# Patient Record
Sex: FEMALE | Race: WHITE | NOT HISPANIC OR LATINO | Employment: FULL TIME | URBAN - METROPOLITAN AREA
[De-identification: names, ages, dates, MRNs, and addresses within clinical notes are randomized per-mention and may not be internally consistent; named-entity substitution may affect disease eponyms.]

---

## 2023-02-23 LAB
ALANINE AMINOTRANSFERASE (SGPT) (U/L) IN SER/PLAS: 23 U/L (ref 7–45)
ALBUMIN (G/DL) IN SER/PLAS: 4.5 G/DL (ref 3.4–5)
ALKALINE PHOSPHATASE (U/L) IN SER/PLAS: 57 U/L (ref 33–110)
ANION GAP IN SER/PLAS: 13 MMOL/L (ref 10–20)
ASPARTATE AMINOTRANSFERASE (SGOT) (U/L) IN SER/PLAS: 21 U/L (ref 9–39)
BILIRUBIN TOTAL (MG/DL) IN SER/PLAS: 1.6 MG/DL (ref 0–1.2)
CALCIUM (MG/DL) IN SER/PLAS: 10.3 MG/DL (ref 8.6–10.6)
CARBON DIOXIDE, TOTAL (MMOL/L) IN SER/PLAS: 28 MMOL/L (ref 21–32)
CHLORIDE (MMOL/L) IN SER/PLAS: 101 MMOL/L (ref 98–107)
CHOLESTEROL (MG/DL) IN SER/PLAS: 196 MG/DL (ref 0–199)
CHOLESTEROL IN HDL (MG/DL) IN SER/PLAS: 73.2 MG/DL
CHOLESTEROL/HDL RATIO: 2.7
CREATININE (MG/DL) IN SER/PLAS: 0.76 MG/DL (ref 0.5–1.05)
GFR FEMALE: >90 ML/MIN/1.73M2
GLUCOSE (MG/DL) IN SER/PLAS: 94 MG/DL (ref 74–99)
LDL: 102 MG/DL (ref 0–99)
POTASSIUM (MMOL/L) IN SER/PLAS: 4.5 MMOL/L (ref 3.5–5.3)
PROTEIN TOTAL: 7.7 G/DL (ref 6.4–8.2)
SODIUM (MMOL/L) IN SER/PLAS: 137 MMOL/L (ref 136–145)
TRIGLYCERIDE (MG/DL) IN SER/PLAS: 104 MG/DL (ref 0–149)
UREA NITROGEN (MG/DL) IN SER/PLAS: 12 MG/DL (ref 6–23)
VLDL: 21 MG/DL (ref 0–40)

## 2023-04-25 ENCOUNTER — OFFICE VISIT (OUTPATIENT)
Dept: PRIMARY CARE | Facility: CLINIC | Age: 48
End: 2023-04-25
Payer: COMMERCIAL

## 2023-04-25 VITALS
HEART RATE: 80 BPM | HEIGHT: 62 IN | SYSTOLIC BLOOD PRESSURE: 124 MMHG | DIASTOLIC BLOOD PRESSURE: 72 MMHG | WEIGHT: 158 LBS | BODY MASS INDEX: 29.08 KG/M2 | TEMPERATURE: 98.6 F | RESPIRATION RATE: 18 BRPM

## 2023-04-25 DIAGNOSIS — K29.00 ACUTE SUPERFICIAL GASTRITIS WITHOUT HEMORRHAGE: ICD-10-CM

## 2023-04-25 DIAGNOSIS — R10.11 RIGHT UPPER QUADRANT ABDOMINAL PAIN: ICD-10-CM

## 2023-04-25 DIAGNOSIS — R07.89 ATYPICAL CHEST PAIN: Primary | ICD-10-CM

## 2023-04-25 PROBLEM — M25.50 ARTHRALGIA: Status: ACTIVE | Noted: 2023-04-25

## 2023-04-25 PROBLEM — E78.5 HYPERLIPIDEMIA: Status: ACTIVE | Noted: 2023-04-25

## 2023-04-25 PROCEDURE — 1036F TOBACCO NON-USER: CPT | Performed by: INTERNAL MEDICINE

## 2023-04-25 PROCEDURE — 99214 OFFICE O/P EST MOD 30 MIN: CPT | Performed by: INTERNAL MEDICINE

## 2023-04-25 RX ORDER — BISACODYL 5 MG/1
1 TABLET, COATED ORAL DAILY
COMMUNITY

## 2023-04-25 RX ORDER — OMEPRAZOLE 40 MG/1
40 CAPSULE, DELAYED RELEASE ORAL
Qty: 30 CAPSULE | Refills: 1 | Status: SHIPPED | OUTPATIENT
Start: 2023-04-25 | End: 2023-06-14 | Stop reason: SDUPTHER

## 2023-04-25 RX ORDER — ROSUVASTATIN CALCIUM 10 MG/1
10 TABLET, COATED ORAL NIGHTLY
COMMUNITY
End: 2023-08-14 | Stop reason: SDUPTHER

## 2023-04-25 ASSESSMENT — PATIENT HEALTH QUESTIONNAIRE - PHQ9
2. FEELING DOWN, DEPRESSED OR HOPELESS: NOT AT ALL
SUM OF ALL RESPONSES TO PHQ9 QUESTIONS 1 AND 2: 0
1. LITTLE INTEREST OR PLEASURE IN DOING THINGS: NOT AT ALL

## 2023-04-25 ASSESSMENT — ENCOUNTER SYMPTOMS: DEPRESSION: 0

## 2023-04-25 ASSESSMENT — COLUMBIA-SUICIDE SEVERITY RATING SCALE - C-SSRS
1. IN THE PAST MONTH, HAVE YOU WISHED YOU WERE DEAD OR WISHED YOU COULD GO TO SLEEP AND NOT WAKE UP?: NO
6. HAVE YOU EVER DONE ANYTHING, STARTED TO DO ANYTHING, OR PREPARED TO DO ANYTHING TO END YOUR LIFE?: NO
2. HAVE YOU ACTUALLY HAD ANY THOUGHTS OF KILLING YOURSELF?: NO

## 2023-04-25 NOTE — PROGRESS NOTES
Subjective   Patient ID: Mariam Hartley is a 47 y.o. female who presents for pain under left pain, heartburn for 8 weeks .  HPI  Patient is here today for pain that she has been feeling under her left breast that radiates up to her neck and her left shoulder.  Its been there for about 6 to 8 weeks.  She states that its not gotten better or worse it is not related to activity or heavy exertion.  It is not related to motion.  She denies being related to food.  She is kind of track that and has not noticed a difference.  She added famotidine and she has noted some small improvement after being on it for just about 1 week.  She saw cardiologist last 5 years ago has a history of high cholesterol and has a strong family history of coronary artery disease so she was concerned.  Her intra-abdominal organs are intact and she still has a gallbladder.  She does not taste a bad taste in her mouth the pain does radiate up into her chest and neck and her bowel movements are normal.  She had last had blood work in February and that was normal it was a CMP and a lipid panel    Review of Systems  Review of systems was performed and is otherwise negative except as noted in HPI.  Objective   Vitals:    04/25/23 1417   BP: 124/72   Pulse: 80   Resp: 18   Temp: 37 °C (98.6 °F)      Physical Exam  HEENT is within normal limits  Lungs are clear bilaterally  Heart is regular rate and rhythm no murmurs  Abdomen is benign soft nontender to palpation  Assessment/Plan   Diagnoses and all orders for this visit:  Atypical chest pain  -     ECG 12 lead; Future  -     Referral to Cardiology; Future  Right upper quadrant abdominal pain  -     US gallbladder; Future  Acute superficial gastritis without hemorrhage  -     omeprazole (PriLOSEC) 40 mg DR capsule; Take 1 capsule (40 mg) by mouth once daily in the morning. Take before meals. Do not crush or chew.    Ordering ultrasound of the right upper quadrant  We will try omeprazole.  Imodium for  possible acid reflux  Ordered a twelve-lead EKG and also referred to cardiology patient wondering about traveling next week as long as the EKG is okay to try to get the ultrasound done ASAP and start the omeprazole if she is feeling better she can go but if not she should stay home and she was referred to cardiology for further evaluation  She needs to see me in 3 to 4 weeks  Sammi Winter MD

## 2023-05-13 PROBLEM — N97.9 FEMALE INFERTILITY: Status: ACTIVE | Noted: 2023-05-13

## 2023-05-13 PROBLEM — N80.9 ENDOMETRIOSIS DETERMINED BY LAPAROSCOPY: Status: ACTIVE | Noted: 2023-05-13

## 2023-05-16 ENCOUNTER — APPOINTMENT (OUTPATIENT)
Dept: PRIMARY CARE | Facility: CLINIC | Age: 48
End: 2023-05-16
Payer: COMMERCIAL

## 2023-05-22 ENCOUNTER — APPOINTMENT (OUTPATIENT)
Dept: PRIMARY CARE | Facility: CLINIC | Age: 48
End: 2023-05-22
Payer: COMMERCIAL

## 2023-06-14 ENCOUNTER — OFFICE VISIT (OUTPATIENT)
Dept: PRIMARY CARE | Facility: CLINIC | Age: 48
End: 2023-06-14
Payer: COMMERCIAL

## 2023-06-14 VITALS
RESPIRATION RATE: 16 BRPM | WEIGHT: 160 LBS | BODY MASS INDEX: 29.44 KG/M2 | HEIGHT: 62 IN | HEART RATE: 76 BPM | TEMPERATURE: 97.3 F | SYSTOLIC BLOOD PRESSURE: 100 MMHG | DIASTOLIC BLOOD PRESSURE: 60 MMHG

## 2023-06-14 DIAGNOSIS — K29.00 ACUTE SUPERFICIAL GASTRITIS WITHOUT HEMORRHAGE: ICD-10-CM

## 2023-06-14 PROCEDURE — 1036F TOBACCO NON-USER: CPT | Performed by: INTERNAL MEDICINE

## 2023-06-14 PROCEDURE — 99213 OFFICE O/P EST LOW 20 MIN: CPT | Performed by: INTERNAL MEDICINE

## 2023-06-14 RX ORDER — OMEPRAZOLE 40 MG/1
40 CAPSULE, DELAYED RELEASE ORAL
Qty: 30 CAPSULE | Refills: 1 | Status: SHIPPED | OUTPATIENT
Start: 2023-06-14 | End: 2024-02-23 | Stop reason: ALTCHOICE

## 2023-06-14 NOTE — PROGRESS NOTES
"Subjective   Patient ID: Mariam Hartley is a 47 y.o. female who presents for fu testing.  HPI  Patient is here for follow-up today.  She was seen in April for chest pain and discomfort.  She was referred to cardiology.  She actually did not call because when she started the omeprazole her symptoms almost completely resolved.  She says she has had 1 or 2 episodes of some burning sensation since she started the omeprazole but she feels that is an acid issue.  She is about 6 weeks into her 12-week course.  She is feeling well her appetite is good she has no more chest pains or shortness of breath no headaches no dizziness no lightheadedness no lower extremity edema and her exercise tolerance is good.  She denies any Headaches she has no constipation she is tolerating the meds well.      Review of Systems  Review of systems was performed and is otherwise negative except as noted in HPI.  Objective   /60   Pulse 76   Temp 36.3 °C (97.3 °F)   Resp 16   Ht 1.575 m (5' 2\")   Wt 72.6 kg (160 lb)   BMI 29.26 kg/m²    Physical Exam  HEENT is normal  Lungs clear bilaterally  Heart regular rate and rhythm no murmurs  Abdomen is benign    Assessment/Plan   Diagnoses and all orders for this visit:  Acute superficial gastritis without hemorrhage  -     omeprazole (PriLOSEC) 40 mg DR capsule; Take 1 capsule (40 mg) by mouth once daily in the morning. Take before meals. Do not crush or chew.    Patient will complete 4 weeks of medication and then go off of it I sent her refill.  If she is unable to successfully discontinue the medication she will call me.  GI referral she will call me in the meantime if she has any other issues  Sammi Winter MD   "

## 2023-07-24 ENCOUNTER — APPOINTMENT (OUTPATIENT)
Dept: PRIMARY CARE | Facility: CLINIC | Age: 48
End: 2023-07-24
Payer: COMMERCIAL

## 2023-08-14 ENCOUNTER — OFFICE VISIT (OUTPATIENT)
Dept: PRIMARY CARE | Facility: CLINIC | Age: 48
End: 2023-08-14
Payer: COMMERCIAL

## 2023-08-14 VITALS
TEMPERATURE: 97.9 F | HEART RATE: 68 BPM | DIASTOLIC BLOOD PRESSURE: 70 MMHG | SYSTOLIC BLOOD PRESSURE: 122 MMHG | WEIGHT: 160 LBS | BODY MASS INDEX: 29.44 KG/M2 | HEIGHT: 62 IN | OXYGEN SATURATION: 99 %

## 2023-08-14 DIAGNOSIS — E78.2 MIXED HYPERLIPIDEMIA: ICD-10-CM

## 2023-08-14 DIAGNOSIS — Z00.00 ROUTINE GENERAL MEDICAL EXAMINATION AT A HEALTH CARE FACILITY: Primary | ICD-10-CM

## 2023-08-14 PROCEDURE — 99396 PREV VISIT EST AGE 40-64: CPT | Performed by: INTERNAL MEDICINE

## 2023-08-14 PROCEDURE — 1036F TOBACCO NON-USER: CPT | Performed by: INTERNAL MEDICINE

## 2023-08-14 RX ORDER — ROSUVASTATIN CALCIUM 10 MG/1
10 TABLET, COATED ORAL NIGHTLY
Qty: 30 TABLET | Refills: 3 | Status: SHIPPED | OUTPATIENT
Start: 2023-08-14 | End: 2023-10-27 | Stop reason: SDUPTHER

## 2023-08-14 ASSESSMENT — PAIN SCALES - GENERAL: PAINLEVEL: 0-NO PAIN

## 2023-08-14 NOTE — PROGRESS NOTES
"Subjective   Patient ID: Mariam Hartley is a 47 y.o. female who presents for Annual Exam.  HPI  Mariam Hartley is here for annual check-up.      Concerns none    Reported Health good    Dental visits reg  Vision checks reg    Diet healthy  Exercise no  Caffeine tea  Tobacco never  Alcohol  yes    Female : Menstrual status /pregnancy status  reg sees gyn    Colorectal cancer screening  utd    Current issues no issues on omerpazole she will complete the next prescription and then go off of it she is aware if her symptoms flare again that she will need to see gastroenterology.  She does have a doctor because she has had a colonoscopy    Review of Systems  GENERAL - Denies fever, fatigue or chills  SKIN - Denies rash, new skin lesions, or change in moles  EYES - Denies blurred vision, or change in visual acuity  EARS - Denies ear pain, discharge, ringing, or difficulty hearing  NOSE - Denies nasal congestion, discharge, or bleeding  MOUTH - Denies sore throat, postnasal drip or painful/difficulty swallowing  NECK - Denies pain or swelling  RESPIRATORY - Denies shortness of breath, cough, wheezing  CARDIOVASCULAR - Denies palpitations, chest pain, orthopnea, peripheral edema, syncope or claudication  GASTROINTESTINAL - Denies nausea, vomiting, diarrhea, constipation, abdominal pain, melena and or bright red blood  GENITOURINARY - Denies dysuria, frequency of urination, urgency, or hesitancy  MUSCULOSKELETAL - Denies joint or muscle pain, or back pain  NEUROLOGICAL - Denies localized numbness, weakness, or tingling  PSYCHIATRIC - Denies depression, anxiety, substance abuse, suicidal or homicidal ideation  ENDOCRINE - Denies heat or cold intolerance, weight loss or gain, increasing thirst  HEMATO-IMMUNOLOGIC - Denies easy bruising, bleeding, oral ulcerations or recurrent infections      Objective   /70   Pulse 68   Temp 36.6 °C (97.9 °F) (Oral)   Ht 1.575 m (5' 2\")   Wt 72.6 kg (160 lb)   SpO2 99%   BMI " 29.26 kg/m²    Physical Exam  CONSTITUTIONAL - well nourished, well developed, looks like stated age, in no acute distress, not ill-appearing, and not tired appearing  SKIN - normal skin color and pigmentation, normal skin turgor without rash, lesions, or nodules visualized  HEAD - no trauma, normocephalic  EYES - nl  ENT - TM's intact, no injection, no exudate,  nasal passage without discharge and patent  NECK - supple without rigidity, no neck mass was observed, no thyromegaly or thyroid nodules  CHEST - clear to auscultation, no wheezing, no crackles and no rales, good effort  CARDIAC - regular rate and regular rhythm, no skipped beats, no murmur  ABDOMEN - no organomegaly, soft, nontender, nondistended, normal bowel sounds, no guarding/rebound/rigidity  EXTREMITIES - no edema, no deformities  NEUROLOGICAL -  nl  PSYCHIATRIC - alert, pleasant and cordial, age-appropriate  LYMPHATIC- no cervical lymphadenopathy    Assessment/Plan   Diagnoses and all orders for this visit:  Routine general medical examination at a health care facility  -     CBC and Auto Differential; Future  -     Comprehensive Metabolic Panel; Future  -     Lipid Panel; Future  -     TSH with reflex to Free T4 if abnormal; Future  Mixed hyperlipidemia    Call with issues  Follow-up with me  annually  If acid reflux symptoms flare she has been to call her gastroenterologist  She will need a 6-month follow-up appointment for her cholesterol medicine      Sammi Winter MD

## 2023-09-01 ENCOUNTER — LAB (OUTPATIENT)
Dept: LAB | Facility: LAB | Age: 48
End: 2023-09-01
Payer: COMMERCIAL

## 2023-09-01 DIAGNOSIS — Z00.00 ROUTINE GENERAL MEDICAL EXAMINATION AT A HEALTH CARE FACILITY: ICD-10-CM

## 2023-09-01 LAB
ALANINE AMINOTRANSFERASE (SGPT) (U/L) IN SER/PLAS: 23 U/L (ref 7–45)
ALBUMIN (G/DL) IN SER/PLAS: 4.6 G/DL (ref 3.4–5)
ALKALINE PHOSPHATASE (U/L) IN SER/PLAS: 58 U/L (ref 33–110)
ANION GAP IN SER/PLAS: 15 MMOL/L (ref 10–20)
ASPARTATE AMINOTRANSFERASE (SGOT) (U/L) IN SER/PLAS: 23 U/L (ref 9–39)
BASOPHILS (10*3/UL) IN BLOOD BY AUTOMATED COUNT: 0.02 X10E9/L (ref 0–0.1)
BASOPHILS/100 LEUKOCYTES IN BLOOD BY AUTOMATED COUNT: 0.3 % (ref 0–2)
BILIRUBIN TOTAL (MG/DL) IN SER/PLAS: 1.4 MG/DL (ref 0–1.2)
CALCIUM (MG/DL) IN SER/PLAS: 10.2 MG/DL (ref 8.6–10.6)
CARBON DIOXIDE, TOTAL (MMOL/L) IN SER/PLAS: 26 MMOL/L (ref 21–32)
CHLORIDE (MMOL/L) IN SER/PLAS: 100 MMOL/L (ref 98–107)
CHOLESTEROL (MG/DL) IN SER/PLAS: 195 MG/DL (ref 0–199)
CHOLESTEROL IN HDL (MG/DL) IN SER/PLAS: 77.3 MG/DL
CHOLESTEROL/HDL RATIO: 2.5
CREATININE (MG/DL) IN SER/PLAS: 0.68 MG/DL (ref 0.5–1.05)
EOSINOPHILS (10*3/UL) IN BLOOD BY AUTOMATED COUNT: 0.04 X10E9/L (ref 0–0.7)
EOSINOPHILS/100 LEUKOCYTES IN BLOOD BY AUTOMATED COUNT: 0.6 % (ref 0–6)
ERYTHROCYTE DISTRIBUTION WIDTH (RATIO) BY AUTOMATED COUNT: 12.8 % (ref 11.5–14.5)
ERYTHROCYTE MEAN CORPUSCULAR HEMOGLOBIN CONCENTRATION (G/DL) BY AUTOMATED: 33.2 G/DL (ref 32–36)
ERYTHROCYTE MEAN CORPUSCULAR VOLUME (FL) BY AUTOMATED COUNT: 100 FL (ref 80–100)
ERYTHROCYTES (10*6/UL) IN BLOOD BY AUTOMATED COUNT: 4.47 X10E12/L (ref 4–5.2)
GFR FEMALE: >90 ML/MIN/1.73M2
GLUCOSE (MG/DL) IN SER/PLAS: 85 MG/DL (ref 74–99)
HEMATOCRIT (%) IN BLOOD BY AUTOMATED COUNT: 44.6 % (ref 36–46)
HEMOGLOBIN (G/DL) IN BLOOD: 14.8 G/DL (ref 12–16)
IMMATURE GRANULOCYTES/100 LEUKOCYTES IN BLOOD BY AUTOMATED COUNT: 0.3 % (ref 0–0.9)
LDL: 105 MG/DL (ref 0–99)
LEUKOCYTES (10*3/UL) IN BLOOD BY AUTOMATED COUNT: 6.5 X10E9/L (ref 4.4–11.3)
LYMPHOCYTES (10*3/UL) IN BLOOD BY AUTOMATED COUNT: 2.03 X10E9/L (ref 1.2–4.8)
LYMPHOCYTES/100 LEUKOCYTES IN BLOOD BY AUTOMATED COUNT: 31.2 % (ref 13–44)
MONOCYTES (10*3/UL) IN BLOOD BY AUTOMATED COUNT: 0.57 X10E9/L (ref 0.1–1)
MONOCYTES/100 LEUKOCYTES IN BLOOD BY AUTOMATED COUNT: 8.8 % (ref 2–10)
NEUTROPHILS (10*3/UL) IN BLOOD BY AUTOMATED COUNT: 3.83 X10E9/L (ref 1.2–7.7)
NEUTROPHILS/100 LEUKOCYTES IN BLOOD BY AUTOMATED COUNT: 58.8 % (ref 40–80)
NRBC (PER 100 WBCS) BY AUTOMATED COUNT: 0 /100 WBC (ref 0–0)
PLATELETS (10*3/UL) IN BLOOD AUTOMATED COUNT: 256 X10E9/L (ref 150–450)
POTASSIUM (MMOL/L) IN SER/PLAS: 4.1 MMOL/L (ref 3.5–5.3)
PROTEIN TOTAL: 8 G/DL (ref 6.4–8.2)
SODIUM (MMOL/L) IN SER/PLAS: 137 MMOL/L (ref 136–145)
THYROTROPIN (MIU/L) IN SER/PLAS BY DETECTION LIMIT <= 0.05 MIU/L: 2.26 MIU/L (ref 0.44–3.98)
TRIGLYCERIDE (MG/DL) IN SER/PLAS: 66 MG/DL (ref 0–149)
UREA NITROGEN (MG/DL) IN SER/PLAS: 9 MG/DL (ref 6–23)
VLDL: 13 MG/DL (ref 0–40)

## 2023-09-01 PROCEDURE — 80053 COMPREHEN METABOLIC PANEL: CPT

## 2023-09-01 PROCEDURE — 84443 ASSAY THYROID STIM HORMONE: CPT

## 2023-09-01 PROCEDURE — 85025 COMPLETE CBC W/AUTO DIFF WBC: CPT

## 2023-09-01 PROCEDURE — 36415 COLL VENOUS BLD VENIPUNCTURE: CPT

## 2023-09-01 PROCEDURE — 80061 LIPID PANEL: CPT

## 2023-10-27 DIAGNOSIS — E78.2 MIXED HYPERLIPIDEMIA: ICD-10-CM

## 2023-10-27 RX ORDER — ROSUVASTATIN CALCIUM 10 MG/1
10 TABLET, COATED ORAL NIGHTLY
Qty: 30 TABLET | Refills: 3 | Status: SHIPPED | OUTPATIENT
Start: 2023-10-27 | End: 2024-02-23 | Stop reason: SDUPTHER

## 2023-10-27 NOTE — TELEPHONE ENCOUNTER
Spoke w/patient, needs refill on  rosuvastatin (Crestor) 10 mg   Send to [facility]  Last appt. 8/14/23  Upcoming appt. 2/23/23

## 2024-02-23 ENCOUNTER — OFFICE VISIT (OUTPATIENT)
Dept: PRIMARY CARE | Facility: CLINIC | Age: 49
End: 2024-02-23
Payer: COMMERCIAL

## 2024-02-23 VITALS
TEMPERATURE: 98.4 F | SYSTOLIC BLOOD PRESSURE: 116 MMHG | BODY MASS INDEX: 23.74 KG/M2 | HEIGHT: 62 IN | HEART RATE: 88 BPM | OXYGEN SATURATION: 99 % | WEIGHT: 129 LBS | DIASTOLIC BLOOD PRESSURE: 68 MMHG

## 2024-02-23 DIAGNOSIS — R63.4 UNEXPLAINED WEIGHT LOSS: ICD-10-CM

## 2024-02-23 DIAGNOSIS — E78.2 MIXED HYPERLIPIDEMIA: ICD-10-CM

## 2024-02-23 DIAGNOSIS — L65.9 ALOPECIA: Primary | ICD-10-CM

## 2024-02-23 PROCEDURE — 1036F TOBACCO NON-USER: CPT | Performed by: INTERNAL MEDICINE

## 2024-02-23 PROCEDURE — 99214 OFFICE O/P EST MOD 30 MIN: CPT | Performed by: INTERNAL MEDICINE

## 2024-02-23 RX ORDER — ROSUVASTATIN CALCIUM 10 MG/1
10 TABLET, COATED ORAL NIGHTLY
Qty: 30 TABLET | Refills: 3 | Status: SHIPPED | OUTPATIENT
Start: 2024-02-23

## 2024-02-23 ASSESSMENT — ENCOUNTER SYMPTOMS
OCCASIONAL FEELINGS OF UNSTEADINESS: 0
DEPRESSION: 0
LOSS OF SENSATION IN FEET: 0

## 2024-02-23 NOTE — PROGRESS NOTES
"Subjective   Patient ID: Mariam Hartley is a 48 y.o. female who presents for Follow-up (EP.  Follow up.  Labs done in September.  No concerns.).  HPI  Patient presents today for follow-up of hyperlipidemia.  She continues on a multivitamin she is off omeprazole.  She has had a colonoscopy and is up-to-date on that.  Her acid reflux symptoms have resolved.  She was concerned initially bringing up hair loss that she has been noticing over the last several months with significant thinning it is coming out and large handfuls there is no bald patches however she also mentions that she has lost about 30 pounds in the last 6 months unexplained other than she has not been nearly as hungry she has not been snacking a lot during the daytime she is given up cheese and crackers after work she has Invisalign braces and has not been taking them out because it is too much of a pain so she has not been snacking at all she still eats regular meals her physical activity is not much increased but she is also under a lot of stress with her job and her daughter having some issues.  Patient denies any sort of pains she has no shortness of breath she has no cough she is up-to-date on her mammogram well woman exams.  Colonoscopy was in 2022 her energy levels been decent although she is tired today.  She does not have any shakes or jitters    Review of Systems  Review of systems was performed and is otherwise negative except as noted in HPI.    Objective   /68   Pulse 88   Temp 36.9 °C (98.4 °F) (Oral)   Ht 1.575 m (5' 2\")   Wt 58.5 kg (129 lb)   LMP 02/09/2024   SpO2 99%   BMI 23.59 kg/m²    Physical Exam  HEENT is normal  Lungs clear bilaterally  Heart is regular rate rhythm no murmurs  Abdomen benign  Lower extremities no edema    Assessment/Plan   Diagnoses and all orders for this visit:  Alopecia  -     Iron and TIBC; Future  -     Ferritin; Future  -     Thyroid Stimulating Hormone; Future  -     Thyroxine, Free; " Future  -     Triiodothyronine, Free; Future  -     CBC and Auto Differential; Future  Mixed hyperlipidemia  -     rosuvastatin (Crestor) 10 mg tablet; Take 1 tablet (10 mg) by mouth once daily at bedtime.  -     Lipid Panel; Future  -     Comprehensive Metabolic Panel; Future  Unexplained weight loss  -     Lactate dehydrogenase; Future  -     C-reactive protein; Future  -     XR chest 2 views; Future    Call with issues  Blood work ordered  Follow-up with me in 4 weeks  Get chest x-ray now  May consider further referral versus further scans if the weight loss does not stabilize and increase  She should try to increase her calories to see if she can put on some weight  We discussed treating anxiety and stress she will consider it but she like to see her labs come out first  Sammi Winter MD

## 2024-02-26 ENCOUNTER — LAB (OUTPATIENT)
Dept: LAB | Facility: LAB | Age: 49
End: 2024-02-26

## 2024-02-26 ENCOUNTER — HOSPITAL ENCOUNTER (OUTPATIENT)
Dept: RADIOLOGY | Facility: CLINIC | Age: 49
Discharge: HOME | End: 2024-02-26
Payer: COMMERCIAL

## 2024-02-26 DIAGNOSIS — L65.9 ALOPECIA: ICD-10-CM

## 2024-02-26 DIAGNOSIS — E78.2 MIXED HYPERLIPIDEMIA: ICD-10-CM

## 2024-02-26 DIAGNOSIS — R63.4 UNEXPLAINED WEIGHT LOSS: ICD-10-CM

## 2024-02-26 LAB
ALBUMIN SERPL BCP-MCNC: 4.5 G/DL (ref 3.4–5)
ALP SERPL-CCNC: 51 U/L (ref 33–110)
ALT SERPL W P-5'-P-CCNC: 21 U/L (ref 7–45)
ANION GAP SERPL CALC-SCNC: 15 MMOL/L (ref 10–20)
AST SERPL W P-5'-P-CCNC: 27 U/L (ref 9–39)
BASOPHILS # BLD AUTO: 0.03 X10*3/UL (ref 0–0.1)
BASOPHILS NFR BLD AUTO: 0.5 %
BILIRUB SERPL-MCNC: 1.3 MG/DL (ref 0–1.2)
BUN SERPL-MCNC: 12 MG/DL (ref 6–23)
CALCIUM SERPL-MCNC: 10 MG/DL (ref 8.6–10.6)
CHLORIDE SERPL-SCNC: 101 MMOL/L (ref 98–107)
CHOLEST SERPL-MCNC: 198 MG/DL (ref 0–199)
CHOLESTEROL/HDL RATIO: 2.4
CO2 SERPL-SCNC: 25 MMOL/L (ref 21–32)
CREAT SERPL-MCNC: 0.58 MG/DL (ref 0.5–1.05)
CRP SERPL-MCNC: <0.1 MG/DL
EGFRCR SERPLBLD CKD-EPI 2021: >90 ML/MIN/1.73M*2
EOSINOPHIL # BLD AUTO: 0.04 X10*3/UL (ref 0–0.7)
EOSINOPHIL NFR BLD AUTO: 0.6 %
ERYTHROCYTE [DISTWIDTH] IN BLOOD BY AUTOMATED COUNT: 12.2 % (ref 11.5–14.5)
FERRITIN SERPL-MCNC: 62 NG/ML (ref 8–150)
GLUCOSE SERPL-MCNC: 68 MG/DL (ref 74–99)
HCT VFR BLD AUTO: 46.2 % (ref 36–46)
HDLC SERPL-MCNC: 82.5 MG/DL
HGB BLD-MCNC: 15.4 G/DL (ref 12–16)
IMM GRANULOCYTES # BLD AUTO: 0.02 X10*3/UL (ref 0–0.7)
IMM GRANULOCYTES NFR BLD AUTO: 0.3 % (ref 0–0.9)
IRON SATN MFR SERPL: 26 % (ref 25–45)
IRON SERPL-MCNC: 88 UG/DL (ref 35–150)
LDH SERPL L TO P-CCNC: 284 U/L (ref 84–246)
LDLC SERPL CALC-MCNC: 101 MG/DL
LYMPHOCYTES # BLD AUTO: 1.95 X10*3/UL (ref 1.2–4.8)
LYMPHOCYTES NFR BLD AUTO: 29.5 %
MCH RBC QN AUTO: 34.8 PG (ref 26–34)
MCHC RBC AUTO-ENTMCNC: 33.3 G/DL (ref 32–36)
MCV RBC AUTO: 104 FL (ref 80–100)
MONOCYTES # BLD AUTO: 0.55 X10*3/UL (ref 0.1–1)
MONOCYTES NFR BLD AUTO: 8.3 %
NEUTROPHILS # BLD AUTO: 4.03 X10*3/UL (ref 1.2–7.7)
NEUTROPHILS NFR BLD AUTO: 60.8 %
NON HDL CHOLESTEROL: 116 MG/DL (ref 0–149)
NRBC BLD-RTO: 0 /100 WBCS (ref 0–0)
PLATELET # BLD AUTO: 217 X10*3/UL (ref 150–450)
POTASSIUM SERPL-SCNC: 4.4 MMOL/L (ref 3.5–5.3)
PROT SERPL-MCNC: 7.4 G/DL (ref 6.4–8.2)
RBC # BLD AUTO: 4.43 X10*6/UL (ref 4–5.2)
SODIUM SERPL-SCNC: 137 MMOL/L (ref 136–145)
T3FREE SERPL-MCNC: 3.1 PG/ML (ref 2.3–4.2)
T4 FREE SERPL-MCNC: 1.01 NG/DL (ref 0.78–1.48)
TIBC SERPL-MCNC: 339 UG/DL (ref 240–445)
TRIGL SERPL-MCNC: 71 MG/DL (ref 0–149)
TSH SERPL-ACNC: 1.84 MIU/L (ref 0.44–3.98)
UIBC SERPL-MCNC: 251 UG/DL (ref 110–370)
VLDL: 14 MG/DL (ref 0–40)
WBC # BLD AUTO: 6.6 X10*3/UL (ref 4.4–11.3)

## 2024-02-26 PROCEDURE — 84443 ASSAY THYROID STIM HORMONE: CPT

## 2024-02-26 PROCEDURE — 84481 FREE ASSAY (FT-3): CPT

## 2024-02-26 PROCEDURE — 83615 LACTATE (LD) (LDH) ENZYME: CPT

## 2024-02-26 PROCEDURE — 83540 ASSAY OF IRON: CPT

## 2024-02-26 PROCEDURE — 80061 LIPID PANEL: CPT

## 2024-02-26 PROCEDURE — 83550 IRON BINDING TEST: CPT

## 2024-02-26 PROCEDURE — 85025 COMPLETE CBC W/AUTO DIFF WBC: CPT

## 2024-02-26 PROCEDURE — 84439 ASSAY OF FREE THYROXINE: CPT

## 2024-02-26 PROCEDURE — 82728 ASSAY OF FERRITIN: CPT

## 2024-02-26 PROCEDURE — 36415 COLL VENOUS BLD VENIPUNCTURE: CPT

## 2024-02-26 PROCEDURE — 86140 C-REACTIVE PROTEIN: CPT

## 2024-02-26 PROCEDURE — 71046 X-RAY EXAM CHEST 2 VIEWS: CPT

## 2024-02-26 PROCEDURE — 71046 X-RAY EXAM CHEST 2 VIEWS: CPT | Performed by: RADIOLOGY

## 2024-02-26 PROCEDURE — 80053 COMPREHEN METABOLIC PANEL: CPT

## 2024-02-27 DIAGNOSIS — R74.02 ELEVATED SERUM LACTATE DEHYDROGENASE (LDH): Primary | ICD-10-CM

## 2024-03-14 PROBLEM — R09.81 NASAL CONGESTION: Status: ACTIVE | Noted: 2024-03-14

## 2024-03-14 PROBLEM — R10.11 RIGHT UPPER QUADRANT ABDOMINAL PAIN: Status: ACTIVE | Noted: 2024-03-14

## 2024-03-14 PROBLEM — M54.50 LOW BACK PAIN: Status: ACTIVE | Noted: 2024-03-14

## 2024-03-14 PROBLEM — R00.2 PALPITATIONS: Status: ACTIVE | Noted: 2024-03-14

## 2024-03-14 PROBLEM — R51.9 HEADACHE: Status: ACTIVE | Noted: 2024-03-14

## 2024-03-14 PROBLEM — H11.9 DISORDER OF CONJUNCTIVA: Status: ACTIVE | Noted: 2024-03-14

## 2024-03-14 PROBLEM — R05.9 COUGH: Status: ACTIVE | Noted: 2024-03-14

## 2024-03-14 PROBLEM — R52 GENERALIZED PAIN: Status: ACTIVE | Noted: 2024-03-14

## 2024-03-14 PROBLEM — R19.7 DIARRHEA: Status: ACTIVE | Noted: 2024-03-14

## 2024-03-14 PROBLEM — R79.89 ABNORMAL COMPLETE BLOOD COUNT: Status: ACTIVE | Noted: 2024-03-14

## 2024-03-14 PROBLEM — S33.5XXA LUMBAR SPRAIN: Status: ACTIVE | Noted: 2024-03-14

## 2024-03-14 PROBLEM — M76.821 POSTERIOR TIBIAL TENDINITIS OF RIGHT LOWER EXTREMITY: Status: ACTIVE | Noted: 2024-03-14

## 2024-03-14 PROBLEM — M25.579 ANKLE PAIN: Status: ACTIVE | Noted: 2024-03-14

## 2024-03-14 PROBLEM — R07.9 CHEST PAIN: Status: ACTIVE | Noted: 2024-03-14

## 2024-03-14 PROBLEM — K29.60 SUPERFICIAL GASTRITIS: Status: ACTIVE | Noted: 2024-03-14

## 2024-03-14 PROBLEM — H57.10 PAIN IN EYE: Status: ACTIVE | Noted: 2024-03-14

## 2024-03-14 PROBLEM — R50.9 FEVER: Status: ACTIVE | Noted: 2024-03-14

## 2024-03-14 PROBLEM — G47.00 INSOMNIA: Status: ACTIVE | Noted: 2024-03-14

## 2024-03-14 PROBLEM — E55.9 VITAMIN D DEFICIENCY: Status: ACTIVE | Noted: 2024-03-14

## 2024-03-14 RX ORDER — OMEPRAZOLE 40 MG/1
40 CAPSULE, DELAYED RELEASE ORAL
COMMUNITY

## 2024-03-27 ENCOUNTER — APPOINTMENT (OUTPATIENT)
Dept: PRIMARY CARE | Facility: CLINIC | Age: 49
End: 2024-03-27
Payer: COMMERCIAL

## 2024-04-22 ENCOUNTER — TELEPHONE (OUTPATIENT)
Dept: PRIMARY CARE | Facility: CLINIC | Age: 49
End: 2024-04-22
Payer: COMMERCIAL

## 2024-04-22 NOTE — TELEPHONE ENCOUNTER
Please call pt [facility] hematology does not want to see here for her issues  so please refer to [facility]  and if that does not work we can try [facility]  or [facility]

## 2024-04-22 NOTE — TELEPHONE ENCOUNTER
Pt called she tried to schedule at [facility] she cannot go there with her insurance she is upset she wants to go to  and refuses to go to [facility] and [facility] and I told her it was recommended she try those 3 offices and she wants to speak to DR. Winter directly.

## 2024-04-23 ENCOUNTER — TELEPHONE (OUTPATIENT)
Dept: HEMATOLOGY/ONCOLOGY | Facility: HOSPITAL | Age: 49
End: 2024-04-23

## 2024-04-23 NOTE — TELEPHONE ENCOUNTER
RN called patient and left message on identifiable voice mail. RN notified her that she will still see Wang Hurt on 5/20 at  [place] as before. Call back instructions reviewed. Referral was changed by Dr. Winter for evaluation of elevated hematocrit. Wang Hurt aware that the focus is now on elevated hematocrit.

## 2024-04-25 ENCOUNTER — DOCUMENTATION (OUTPATIENT)
Dept: HEMATOLOGY/ONCOLOGY | Facility: HOSPITAL | Age: 49
End: 2024-04-25
Payer: COMMERCIAL

## 2024-04-25 NOTE — PROGRESS NOTES
Diagnosis elevated Hematocrit, elevated LDH.  History includes-HLD, 30 pound weight loss 6 months, alopecia, arthralgia, endometriosis, female infertility, Vitamin D deficiency, diarrhea, abnormal complete blood count, low back pain.  Last labs 2/26/24 RBC 4.43, HGB 15.4, Hematocrit 46.2, , MCH 34.8, , T bili 1.3  Patient was left a message on 4/23/24 by RAFFI Schroeder RN with details for appointment scheduled on 5/20/24.

## 2024-05-20 ENCOUNTER — OFFICE VISIT (OUTPATIENT)
Dept: HEMATOLOGY/ONCOLOGY | Facility: CLINIC | Age: 49
End: 2024-05-20
Payer: COMMERCIAL

## 2024-05-20 ENCOUNTER — LAB (OUTPATIENT)
Dept: LAB | Facility: CLINIC | Age: 49
End: 2024-05-20
Payer: COMMERCIAL

## 2024-05-20 VITALS
BODY MASS INDEX: 23.73 KG/M2 | DIASTOLIC BLOOD PRESSURE: 86 MMHG | SYSTOLIC BLOOD PRESSURE: 131 MMHG | OXYGEN SATURATION: 100 % | WEIGHT: 129.74 LBS | TEMPERATURE: 97.7 F | HEART RATE: 84 BPM | RESPIRATION RATE: 18 BRPM

## 2024-05-20 DIAGNOSIS — R71.8 ELEVATED HEMATOCRIT: ICD-10-CM

## 2024-05-20 DIAGNOSIS — R74.02 ELEVATED LDH: ICD-10-CM

## 2024-05-20 DIAGNOSIS — R71.8 ELEVATED HEMATOCRIT: Primary | ICD-10-CM

## 2024-05-20 DIAGNOSIS — R17 ELEVATED BILIRUBIN: ICD-10-CM

## 2024-05-20 DIAGNOSIS — R74.02 ELEVATED SERUM LACTATE DEHYDROGENASE (LDH): ICD-10-CM

## 2024-05-20 LAB
ALBUMIN SERPL BCP-MCNC: 4.5 G/DL (ref 3.4–5)
ALP SERPL-CCNC: 43 U/L (ref 33–110)
ALT SERPL W P-5'-P-CCNC: 14 U/L (ref 7–45)
ANION GAP SERPL CALC-SCNC: 13 MMOL/L (ref 10–20)
AST SERPL W P-5'-P-CCNC: 15 U/L (ref 9–39)
BASOPHILS # BLD AUTO: 0.01 X10*3/UL (ref 0–0.1)
BASOPHILS NFR BLD AUTO: 0.2 %
BILIRUB DIRECT SERPL-MCNC: 0.3 MG/DL (ref 0–0.3)
BILIRUB SERPL-MCNC: 1.9 MG/DL (ref 0–1.2)
BUN SERPL-MCNC: 14 MG/DL (ref 6–23)
CALCIUM SERPL-MCNC: 9.4 MG/DL (ref 8.6–10.6)
CHLORIDE SERPL-SCNC: 102 MMOL/L (ref 98–107)
CO2 SERPL-SCNC: 26 MMOL/L (ref 21–32)
CREAT SERPL-MCNC: 0.61 MG/DL (ref 0.5–1.05)
CRP SERPL-MCNC: <0.1 MG/DL
DAT-POLYSPECIFIC: NORMAL
EGFRCR SERPLBLD CKD-EPI 2021: >90 ML/MIN/1.73M*2
EOSINOPHIL # BLD AUTO: 0.07 X10*3/UL (ref 0–0.7)
EOSINOPHIL NFR BLD AUTO: 1.1 %
ERYTHROCYTE [DISTWIDTH] IN BLOOD BY AUTOMATED COUNT: 11.7 % (ref 11.5–14.5)
ERYTHROCYTE [SEDIMENTATION RATE] IN BLOOD BY WESTERGREN METHOD: 14 MM/H (ref 0–20)
FERRITIN SERPL-MCNC: 68 NG/ML (ref 8–150)
FOLATE SERPL-MCNC: 10.9 NG/ML
GLUCOSE SERPL-MCNC: 87 MG/DL (ref 74–99)
HCT VFR BLD AUTO: 42.7 % (ref 36–46)
HGB BLD-MCNC: 14.8 G/DL (ref 12–16)
HGB RETIC QN: 38 PG (ref 28–38)
IMM GRANULOCYTES # BLD AUTO: 0 X10*3/UL (ref 0–0.7)
IMM GRANULOCYTES NFR BLD AUTO: 0 % (ref 0–0.9)
IMMATURE RETIC FRACTION: 6.1 %
IRON SATN MFR SERPL: 65 % (ref 25–45)
IRON SERPL-MCNC: 239 UG/DL (ref 35–150)
LDH SERPL L TO P-CCNC: 127 U/L (ref 84–246)
LYMPHOCYTES # BLD AUTO: 1.93 X10*3/UL (ref 1.2–4.8)
LYMPHOCYTES NFR BLD AUTO: 31.2 %
MCH RBC QN AUTO: 35.1 PG (ref 26–34)
MCHC RBC AUTO-ENTMCNC: 34.7 G/DL (ref 32–36)
MCV RBC AUTO: 101 FL (ref 80–100)
MONOCYTES # BLD AUTO: 0.53 X10*3/UL (ref 0.1–1)
MONOCYTES NFR BLD AUTO: 8.6 %
NEUTROPHILS # BLD AUTO: 3.64 X10*3/UL (ref 1.2–7.7)
NEUTROPHILS NFR BLD AUTO: 58.9 %
NRBC BLD-RTO: ABNORMAL /100{WBCS}
PLATELET # BLD AUTO: 220 X10*3/UL (ref 150–450)
POTASSIUM SERPL-SCNC: 4 MMOL/L (ref 3.5–5.3)
PROT SERPL-MCNC: 7.3 G/DL (ref 6.4–8.2)
RBC # BLD AUTO: 4.22 X10*6/UL (ref 4–5.2)
RETICS #: 0.07 X10*6/UL (ref 0.02–0.08)
RETICS/RBC NFR AUTO: 1.6 % (ref 0.5–2)
SODIUM SERPL-SCNC: 137 MMOL/L (ref 136–145)
TESTOST SERPL-MCNC: 39 NG/DL (ref 0–70)
TIBC SERPL-MCNC: 365 UG/DL (ref 240–445)
UIBC SERPL-MCNC: 126 UG/DL (ref 110–370)
VIT B12 SERPL-MCNC: 272 PG/ML (ref 211–911)
WBC # BLD AUTO: 6.2 X10*3/UL (ref 4.4–11.3)

## 2024-05-20 PROCEDURE — 86038 ANTINUCLEAR ANTIBODIES: CPT

## 2024-05-20 PROCEDURE — 80053 COMPREHEN METABOLIC PANEL: CPT

## 2024-05-20 PROCEDURE — 83615 LACTATE (LD) (LDH) ENZYME: CPT

## 2024-05-20 PROCEDURE — 82607 VITAMIN B-12: CPT

## 2024-05-20 PROCEDURE — 82746 ASSAY OF FOLIC ACID SERUM: CPT

## 2024-05-20 PROCEDURE — 84403 ASSAY OF TOTAL TESTOSTERONE: CPT

## 2024-05-20 PROCEDURE — 85025 COMPLETE CBC W/AUTO DIFF WBC: CPT

## 2024-05-20 PROCEDURE — G0452 MOLECULAR PATHOLOGY INTERPR: HCPCS | Performed by: PATHOLOGY

## 2024-05-20 PROCEDURE — 36415 COLL VENOUS BLD VENIPUNCTURE: CPT

## 2024-05-20 PROCEDURE — 85652 RBC SED RATE AUTOMATED: CPT

## 2024-05-20 PROCEDURE — 83010 ASSAY OF HAPTOGLOBIN QUANT: CPT

## 2024-05-20 PROCEDURE — 86880 COOMBS TEST DIRECT: CPT

## 2024-05-20 PROCEDURE — 82728 ASSAY OF FERRITIN: CPT

## 2024-05-20 PROCEDURE — 82668 ASSAY OF ERYTHROPOIETIN: CPT

## 2024-05-20 PROCEDURE — 86235 NUCLEAR ANTIGEN ANTIBODY: CPT

## 2024-05-20 PROCEDURE — 83550 IRON BINDING TEST: CPT

## 2024-05-20 PROCEDURE — 86140 C-REACTIVE PROTEIN: CPT

## 2024-05-20 PROCEDURE — 99214 OFFICE O/P EST MOD 30 MIN: CPT | Performed by: PHYSICIAN ASSISTANT

## 2024-05-20 PROCEDURE — 99204 OFFICE O/P NEW MOD 45 MIN: CPT | Performed by: PHYSICIAN ASSISTANT

## 2024-05-20 PROCEDURE — 82248 BILIRUBIN DIRECT: CPT

## 2024-05-20 PROCEDURE — 85045 AUTOMATED RETICULOCYTE COUNT: CPT

## 2024-05-20 PROCEDURE — 81450 HL NEO GSAP 5-50DNA/DNA&RNA: CPT

## 2024-05-20 ASSESSMENT — ENCOUNTER SYMPTOMS
OCCASIONAL FEELINGS OF UNSTEADINESS: 0
LOSS OF SENSATION IN FEET: 0
DEPRESSION: 0

## 2024-05-20 ASSESSMENT — PAIN SCALES - GENERAL: PAINLEVEL: 0-NO PAIN

## 2024-05-20 NOTE — PROGRESS NOTES
Patient ID: Mariam Hartley is a 48 y.o. female.  Referring Physician: Wang Hurt PA-C  [id number] [place] [address]  Primary Care Provider: Sammi Winter MD  Visit Type: Initial Visit    Location: [facility]  Diagnosis/Reason: Elevated Hematocrit    HPI:  Mariam Hartley is a 48 y.o. female referred for consultation of elevated hematocrit    She is accompanied by her  for today's visit    Per review of records:  WBC counts WNL historically - Differentials WNL historically  Episodically macrocytic (7/10/20 at [facility] at 102 and 2/26/24 [facility] at 104) - Persistently normochromic RBC's that have been persistently WNL historically  Hb's persistently WNL - 1 episode of elevated Hct at 36.2% on 2/26/24  RDW's persistently WNL  Platelets persistently WNL  1 episode of elevated LDH on 2/26/24 at 284  1 episode of elevated hematocrit on 2/26/24 at 46.2%    Menstrual Cycle History:  About how long do your periods last: Approximately 6 days  How would do you describe your flow during your period: Varies each month, some are heavy and some lighter - Previously always heavy periods  How frequently do you have to change your menstrual pads during your period: Every 2 hours when bleeding is at its worst    Previous Hematological Background:  Hx of hematological disorders: No - Patient denies prior hematologic history  Hx of blood transfusions: No - Patient denies prior blood transfusion  Hx of iron supplementation: Yes - Oral supplementation - Denies adverse effect or reaction - Agent: FeSO4  - Patient reports she was started on this dermatology for hair loss  Hx of B12 supplementation: No - Denies any prior supplementation  Hx of folate supplementation: No - Denies any prior supplementation    As it relates to polycythemia:  Do you have carbon monoxide detector in your home? Yes  Do you smoke? Never smoker  Do you snore? / Have you woken yourself up from snoring before?  reports she  snores sometimes at night, but has not woken herself or him up from snoring  Do you use any performance enhancing drugs or testosterone supplements? Denies  Do you use any supplemental workout supplements? Denies    Today she c/o palpitations (associated w/ anxiety),    Patient denies weight loss, abnormal bruising and bleeding, hematuria, blood in stool, dark/black stools, epistaxis, oral/gingival bleeding, lymphadenopathy, recurrent infections, recurrent fevers, night sweats, early satiety, abdominal pain, bone pain, chest pain, palpitations, SOB, ANAND, fatigue, dizziness, lightheadedness, PICA.    PMHx:  Active Ambulatory Problems     Diagnosis Date Noted    Hyperlipidemia 2023    Arthralgia 2023    Endometriosis determined by laparoscopy 2023    Female infertility 2023    Vitamin D deficiency 2024    Lumbar sprain 2024    Superficial gastritis 2024    Right upper quadrant abdominal pain 2024    Posterior tibial tendinitis of right lower extremity 2024    Palpitations 2024    Pain in eye 2024    Headache 2024    Nasal congestion 2024    Low back pain 2024    Insomnia 2024    Generalized pain 2024    Fever 2024    Disorder of conjunctiva 2024    Diarrhea 2024    Cough 2024    Chest pain 2024    Ankle pain 2024    Abnormal complete blood count 2024     Resolved Ambulatory Problems     Diagnosis Date Noted    No Resolved Ambulatory Problems     Past Medical History:   Diagnosis Date    Personal history of other diseases of the circulatory system     Personal history of other diseases of the respiratory system     Personal history of other specified conditions 2013    Rash and other nonspecific skin eruption      PSHx:  Past Surgical History:   Procedure Laterality Date     SECTION, CLASSIC  2013     Section    OTHER SURGICAL HISTORY  2013     Laparoscopy Of Uterus     FHx:  Family History   Problem Relation Name Age of Onset    Hyperlipidemia Mother      Mitral valve prolapse Mother      Hypertension Father      Hyperlipidemia Father      Alcohol abuse Father        Maternal grandmother: SLE  Multiple family members: Breast CA  Children:  2 children total - 1 daughter w/ Celiac  Miscarriages: Denies    Social Hx:  Mariam Hartley    reports that she has never smoked. She has never used smokeless tobacco.  She  reports current alcohol use.  She  reports no history of drug use.  Social History     Socioeconomic History    Marital status:      Spouse name: Not on file    Number of children: Not on file    Years of education: Not on file    Highest education level: Not on file   Occupational History    Not on file   Tobacco Use    Smoking status: Never    Smokeless tobacco: Never   Vaping Use    Vaping status: Never Used   Substance and Sexual Activity    Alcohol use: Yes    Drug use: Never    Sexual activity: Never   Other Topics Concern    Not on file   Social History Narrative    Not on file     Social Determinants of Health     Financial Resource Strain: Not on file   Food Insecurity: Not on file   Transportation Needs: Not on file   Physical Activity: Not on file   Stress: Not on file   Social Connections: Not on file   Intimate Partner Violence: Not on file   Housing Stability: Not on file      Living Situation: Lives at home w/ family  Occupation:   Marital Status:   Alcohol Use: Daily - Approximately 3 glasses of wine  Smoking: Denies  Recreational Drug Use: Denies  Special Diets: Regular    Cancer Screenings:  Upper EGD: Denies  Colonoscopy: 11/11/2022   Mammogram: 4/23/24  PAP smear: Approximately 2 years ago  Lung cancer screenings: N/A - Never smoker    Medications and allergies reviewed in EMR.    ROS:  Review of Systems - Oncology   10 point review of systems negative except as state in HPI.    Vitals &  Statistics:  Objective   BSA: There is no height or weight on file to calculate BSA.  There were no vitals taken for this visit.    Physical Exam:  Physical Exam  Vitals and nursing note reviewed.   Constitutional:       Appearance: Normal appearance. She is normal weight.   HENT:      Head: Normocephalic and atraumatic.      Right Ear: External ear normal.      Left Ear: External ear normal.      Nose: Nose normal.      Mouth/Throat:      Mouth: Mucous membranes are moist.      Pharynx: Oropharynx is clear.   Eyes:      Extraocular Movements: Extraocular movements intact.      Conjunctiva/sclera: Conjunctivae normal.      Pupils: Pupils are equal, round, and reactive to light.   Cardiovascular:      Rate and Rhythm: Normal rate and regular rhythm.      Pulses: Normal pulses.      Heart sounds: Normal heart sounds.   Pulmonary:      Effort: Pulmonary effort is normal.      Breath sounds: Normal breath sounds.   Abdominal:      General: Abdomen is flat. Bowel sounds are normal.      Palpations: Abdomen is soft.      Comments: No masses or organomegaly palpable during exam   Musculoskeletal:         General: Normal range of motion.      Cervical back: Normal range of motion.   Lymphadenopathy:      Comments: No lymphadenopathy palpable   Skin:     General: Skin is warm and dry.      Capillary Refill: Capillary refill takes less than 2 seconds.   Neurological:      Mental Status: She is alert and oriented to person, place, and time. Mental status is at baseline.   Psychiatric:         Mood and Affect: Mood normal.         Behavior: Behavior normal.         Thought Content: Thought content normal.         Judgment: Judgment normal.           Results:  Lab Results   Component Value Date    WBC 6.6 02/26/2024    NEUTROABS 4.03 02/26/2024    IGABSOL 0.02 02/26/2024    LYMPHSABS 1.95 02/26/2024    MONOSABS 0.55 02/26/2024    EOSABS 0.04 02/26/2024    BASOSABS 0.03 02/26/2024    RBC 4.43 02/26/2024     (H) 02/26/2024  "   MCHC 33.3 02/26/2024    HGB 15.4 02/26/2024    HCT 46.2 (H) 02/26/2024     02/26/2024     No results found for: \"RETICCTPCT\"   Lab Results   Component Value Date    CREATININE 0.58 02/26/2024    BUN 12 02/26/2024    EGFR >90 02/26/2024     02/26/2024    K 4.4 02/26/2024     02/26/2024    CO2 25 02/26/2024      Lab Results   Component Value Date    ALT 21 02/26/2024    AST 27 02/26/2024    ALKPHOS 51 02/26/2024    BILITOT 1.3 (H) 02/26/2024      Lab Results   Component Value Date    TSH 1.84 02/26/2024     Lab Results   Component Value Date    TSH 1.84 02/26/2024     Lab Results   Component Value Date    IRON 88 02/26/2024    TIBC 339 02/26/2024    FERRITIN 62 02/26/2024      Lab Results   Component Value Date    YAFHAPNB75 480 07/24/2020      Lab Results   Component Value Date    FOLATE 18.9 07/24/2020     No results found for: \"RJ\", \"RF\", \"SEDRATE\"   Lab Results   Component Value Date    CRP <0.10 02/26/2024      No results found for: \"KAVON\"  Lab Results   Component Value Date     (H) 02/26/2024     No results found for: \"HAPTOGLOBIN\"  No results found for: \"SPEP\"  No results found for: \"IGG\", \"IGM\", \"IGA\"  No results found for: \"HEPATOT\", \"HEPAIGM\", \"HEPBCIGM\", \"HEPBCAB\", \"HEPBSAG\", \"HEPCAB\"  No results found for: \"HIV1X2\"    Assessment:  Mariam Hartley is a 48 y.o. female referred for consultation of elevated Hct    I reviewed patient's chart including but not limited to labs, imaging, surgical/procedure notes, pathology, hospital notes, doctor's notes.    5/20/24 results:  WBC count & differential WNL  Macrocytic, Normochromic RBC's that are normal in count - H&H WNL - RDW WNL  Platelets WNL  Remaining results pending at time of writing    Plan:  Discussed the etiologies of elevated LDH w/ patient including by not limited to: cardiac myocyte damage (demand ischemia, trauma, surgery, toxins, infection, drugs), heart failure w/ resultant hepatic congestion, prosthetic valves causing " hemolysis, bacterial meningitis, cerebral hemorrhage, cerebral venous thrombosis, hypothyroidism, acromegaly, Cushing's syndrome, diabetic muscle infarction, acute pancreatitis, intestinal obstruction, early acute hepatitis, ischemic hepatitis, inherited hemolytic anemias (spherocytosis, sickle cell disease, deficiency of red blood cell enzymes), acquired hemolytic anemias (microangiopathic hemolytic anemia, PNH, immune hemolysis), pernicious anemia, folic acid deficiency, iron deficiency, primary myelofibrosis, infections (pneumocystis pneumonia (late), tuberculosis, malaria, Legionnaires disease, histoplasmosis, toxoplasmosis, malignancy, myopathies, pregnancy, PE, rheumatologic condition, vasculitis, idiosyncratic, etc.    Discussed possible etiologies for polycythemia including but not limited to: tobacco, etoh, or recreational drug use. carbon monoxide poisoning, COPD, MANA dehydration, tumor, enlarged liver/spleen, EPO doping, genetic mutations, primary vs 2ndary polycythemia, etc     Elevated LDH / R/O Hemolysis  Lab results pending - Will review when available and address adverse results as needed  RTC in 2-4 weeks via virtual/telehealth visit - F/U sooner if needed/urgent    I had an extensive discussion with the patient regarding the diagnosis and discussed the plan of therapy, including general considerations regarding side effects and outcomes. Pt understood and gave appropriate teach back about the plan of care. All questions were answered to the patient's satisfaction. The patient is instructed to contact us at any time if questions or problems arise. Thank you for the opportunity to participate in the care of this very pleasant patient.    Total time = 80 minutes. 50% or more of this time was spent in counseling and/or coordination of care including reviewing medical history/radiology/labs, examining patient, formulating outlined plan with team, and discussing plan with patient/family.    Wang REHMAN  TRACI Hurt

## 2024-05-21 LAB — HAPTOGLOB SERPL-MCNC: 57 MG/DL (ref 37–246)

## 2024-05-22 ENCOUNTER — APPOINTMENT (OUTPATIENT)
Dept: PRIMARY CARE | Facility: CLINIC | Age: 49
End: 2024-05-22
Payer: COMMERCIAL

## 2024-05-22 LAB — EPO SERPL-ACNC: 11 MU/ML (ref 4–27)

## 2024-05-24 LAB
ELECTRONICALLY SIGNED BY: NORMAL
MYELOID NGS RESULTS: NORMAL

## 2024-05-27 LAB
ANA PATTERN: ABNORMAL
ANA SER QL HEP2 SUBST: POSITIVE
ANA TITR SER IF: ABNORMAL {TITER}

## 2024-05-28 LAB
CENTROMERE B AB SER-ACNC: <0.2 AI
CHROMATIN AB SERPL-ACNC: 0.5 AI
DSDNA AB SER-ACNC: 8 IU/ML
ENA JO1 AB SER QL IA: <0.2 AI
ENA RNP AB SER IA-ACNC: <0.2 AI
ENA SCL70 AB SER QL IA: <0.2 AI
ENA SM AB SER IA-ACNC: <0.2 AI
ENA SM+RNP AB SER QL IA: <0.2 AI
ENA SS-A AB SER IA-ACNC: <0.2 AI
ENA SS-B AB SER IA-ACNC: <0.2 AI
RIBOSOMAL P AB SER-ACNC: <0.2 AI

## 2024-06-11 ENCOUNTER — TELEMEDICINE (OUTPATIENT)
Dept: HEMATOLOGY/ONCOLOGY | Facility: HOSPITAL | Age: 49
End: 2024-06-11
Payer: COMMERCIAL

## 2024-06-11 DIAGNOSIS — R74.02 ELEVATED LDH: Primary | ICD-10-CM

## 2024-06-11 DIAGNOSIS — R71.8 ELEVATED HEMATOCRIT: ICD-10-CM

## 2024-06-11 DIAGNOSIS — R17 ELEVATED BILIRUBIN: ICD-10-CM

## 2024-06-11 DIAGNOSIS — R79.89 ABNORMAL COMPLETE BLOOD COUNT: ICD-10-CM

## 2024-06-11 NOTE — PROGRESS NOTES
Patient ID: Mariam Hartley is a 48 y.o. female.  Referring Physician: No referring provider defined for this encounter.  Primary Care Provider: Sammi Winter MD  Visit Type: Follow Up    Location: [facility]  Diagnosis/Reason: Elevated LDH - R/O hemolysis, elevated Hct    Interval Hx:  6/11/24  Mariam Hartley is a 48 y.o. female following up today for elevated LDH, elevated Hct    Patient denies weight loss, abnormal bruising and bleeding, hematuria, blood in stool, dark/black stools, epistaxis, oral/gingival bleeding, lymphadenopathy, recurrent infections, recurrent fevers, night sweats, early satiety, abdominal pain, bone pain, chest pain, palpitations, SOB, ANAND, fatigue, dizziness, lightheadedness, PICA.    Relevant Hx:  5/20/24 - Initial Consult  Mariam Hartley is a 48 y.o. female referred for consultation of elevated LDH, r/o hemolysis    She is accompanied by her  for today's visit    Per review of records:  WBC counts WNL historically - Differentials WNL historically  Episodically macrocytic (7/10/20 at [facility] at 102 and 2/26/24 [facility] at 104) - Persistently normochromic RBC's that have been persistently WNL historically  Hb's persistently WNL - 1 episode of elevated Hct at 36.2% on 2/26/24  RDW's persistently WNL  Platelets persistently WNL  1 episode of elevated LDH on 2/26/24 at 284  1 episode of elevated hematocrit on 2/26/24 at 46.2%    Menstrual Cycle History:  About how long do your periods last: Approximately 6 days  How would do you describe your flow during your period: Varies each month, some are heavy and some lighter - Previously always heavy periods  How frequently do you have to change your menstrual pads during your period: Every 2 hours when bleeding is at its worst    Previous Hematological Background:  Hx of hematological disorders: No - Patient denies prior hematologic history  Hx of blood transfusions: No - Patient denies prior blood transfusion  Hx of iron  supplementation: Yes - Oral supplementation - Denies adverse effect or reaction - Agent: FeSO4  - Patient reports she was started on this dermatology for hair loss  Hx of B12 supplementation: No - Denies any prior supplementation  Hx of folate supplementation: No - Denies any prior supplementation    As it relates to polycythemia:  Do you have carbon monoxide detector in your home? Yes  Do you smoke? Never smoker  Do you snore? / Have you woken yourself up from snoring before?  reports she snores sometimes at night, but has not woken herself or him up from snoring  Do you use any performance enhancing drugs or testosterone supplements? Denies  Do you use any supplemental workout supplements? Denies    Today she c/o palpitations (associated w/ anxiety),    Patient denies weight loss, abnormal bruising and bleeding, hematuria, blood in stool, dark/black stools, epistaxis, oral/gingival bleeding, lymphadenopathy, recurrent infections, recurrent fevers, night sweats, early satiety, abdominal pain, bone pain, chest pain, palpitations, SOB, ANAND, fatigue, dizziness, lightheadedness, PICA.    PMHx:  Active Ambulatory Problems     Diagnosis Date Noted    Hyperlipidemia 04/25/2023    Arthralgia 04/25/2023    Endometriosis determined by laparoscopy 05/13/2023    Female infertility 05/13/2023    Vitamin D deficiency 03/14/2024    Lumbar sprain 03/14/2024    Superficial gastritis 03/14/2024    Right upper quadrant abdominal pain 03/14/2024    Posterior tibial tendinitis of right lower extremity 03/14/2024    Palpitations 03/14/2024    Pain in eye 03/14/2024    Headache 03/14/2024    Nasal congestion 03/14/2024    Low back pain 03/14/2024    Insomnia 03/14/2024    Generalized pain 03/14/2024    Fever 03/14/2024    Disorder of conjunctiva 03/14/2024    Diarrhea 03/14/2024    Cough 03/14/2024    Chest pain 03/14/2024    Ankle pain 03/14/2024    Abnormal complete blood count 03/14/2024     Resolved Ambulatory Problems      Diagnosis Date Noted    No Resolved Ambulatory Problems     Past Medical History:   Diagnosis Date    Personal history of other diseases of the circulatory system     Personal history of other diseases of the respiratory system     Personal history of other specified conditions 2013    Rash and other nonspecific skin eruption      PSHx:  Past Surgical History:   Procedure Laterality Date     SECTION, CLASSIC  2013     Section    OTHER SURGICAL HISTORY  2013    Laparoscopy Of Uterus     FHx:  Family History   Problem Relation Name Age of Onset    Hyperlipidemia Mother      Mitral valve prolapse Mother      Hypertension Father      Hyperlipidemia Father      Alcohol abuse Father        Maternal grandmother: SLE  Multiple family members: Breast CA  Children:  2 children total - 1 daughter w/ Celiac  Miscarriages: Denies    Social Hx:  Mariam Hartley    reports that she has never smoked. She has never used smokeless tobacco.  She  reports current alcohol use.  She  reports no history of drug use.  Social History     Socioeconomic History    Marital status:      Spouse name: Not on file    Number of children: Not on file    Years of education: Not on file    Highest education level: Not on file   Occupational History    Not on file   Tobacco Use    Smoking status: Never    Smokeless tobacco: Never   Vaping Use    Vaping status: Never Used   Substance and Sexual Activity    Alcohol use: Yes    Drug use: Never    Sexual activity: Never   Other Topics Concern    Not on file   Social History Narrative    Not on file     Social Determinants of Health     Financial Resource Strain: Not on file   Food Insecurity: Not on file   Transportation Needs: Not on file   Physical Activity: Not on file   Stress: Not on file   Social Connections: Not on file   Intimate Partner Violence: Not on file   Housing Stability: Not on file      Living Situation: Lives at home w/ family  Occupation: Safety  Director  Marital Status:   Alcohol Use: Daily - Approximately 3 glasses of wine  Smoking: Denies  Recreational Drug Use: Denies  Special Diets: Regular    Cancer Screenings:  Upper EGD: Denies  Colonoscopy: 11/11/2022   Mammogram: 4/23/24  PAP smear: Approximately 2 years ago  Lung cancer screenings: N/A - Never smoker    Medications and allergies reviewed in EMR.    ROS:  Review of Systems - Oncology   10 point review of systems negative except as state in HPI.    Vitals & Statistics:  Objective   BSA: There is no height or weight on file to calculate BSA.  There were no vitals taken for this visit.    Physical Exam:  Physical Exam  Vitals and nursing note reviewed.   Constitutional:       Appearance: Normal appearance. She is normal weight.   HENT:      Head: Normocephalic and atraumatic.      Right Ear: External ear normal.      Left Ear: External ear normal.      Nose: Nose normal.      Mouth/Throat:      Mouth: Mucous membranes are moist.      Pharynx: Oropharynx is clear.   Eyes:      Extraocular Movements: Extraocular movements intact.      Conjunctiva/sclera: Conjunctivae normal.      Pupils: Pupils are equal, round, and reactive to light.   Cardiovascular:      Rate and Rhythm: Normal rate and regular rhythm.      Pulses: Normal pulses.      Heart sounds: Normal heart sounds.   Pulmonary:      Effort: Pulmonary effort is normal.      Breath sounds: Normal breath sounds.   Abdominal:      General: Abdomen is flat. Bowel sounds are normal.      Palpations: Abdomen is soft.      Comments: No masses or organomegaly palpable during exam   Musculoskeletal:         General: Normal range of motion.      Cervical back: Normal range of motion.   Lymphadenopathy:      Comments: No lymphadenopathy palpable   Skin:     General: Skin is warm and dry.      Capillary Refill: Capillary refill takes less than 2 seconds.   Neurological:      Mental Status: She is alert and oriented to person, place, and time. Mental  "status is at baseline.   Psychiatric:         Mood and Affect: Mood normal.         Behavior: Behavior normal.         Thought Content: Thought content normal.         Judgment: Judgment normal.           Results:  Lab Results   Component Value Date    WBC 6.2 05/20/2024    NEUTROABS 3.64 05/20/2024    IGABSOL 0.00 05/20/2024    LYMPHSABS 1.93 05/20/2024    MONOSABS 0.53 05/20/2024    EOSABS 0.07 05/20/2024    BASOSABS 0.01 05/20/2024    RBC 4.22 05/20/2024     (H) 05/20/2024    MCHC 34.7 05/20/2024    HGB 14.8 05/20/2024    HCT 42.7 05/20/2024     05/20/2024     Lab Results   Component Value Date    RETICCTPCT 1.6 05/20/2024      Lab Results   Component Value Date    CREATININE 0.61 05/20/2024    BUN 14 05/20/2024    EGFR >90 05/20/2024     05/20/2024    K 4.0 05/20/2024     05/20/2024    CO2 26 05/20/2024      Lab Results   Component Value Date    ALT 14 05/20/2024    AST 15 05/20/2024    ALKPHOS 43 05/20/2024    BILITOT 1.9 (H) 05/20/2024      Lab Results   Component Value Date    TSH 1.84 02/26/2024     Lab Results   Component Value Date    TSH 1.84 02/26/2024     Lab Results   Component Value Date    IRON 239 (H) 05/20/2024    TIBC 365 05/20/2024    FERRITIN 68 05/20/2024      Lab Results   Component Value Date    TWALCCBJ73 272 05/20/2024      Lab Results   Component Value Date    FOLATE 10.9 05/20/2024     Lab Results   Component Value Date    RJ Positive (A) 05/20/2024    SEDRATE 14 05/20/2024      Lab Results   Component Value Date    CRP <0.10 05/20/2024      No results found for: \"KAVON\"  Lab Results   Component Value Date     05/20/2024     Lab Results   Component Value Date    HAPTOGLOBIN 57 05/20/2024     No results found for: \"SPEP\"  No results found for: \"IGG\", \"IGM\", \"IGA\"  No results found for: \"HEPATOT\", \"HEPAIGM\", \"HEPBCIGM\", \"HEPBCAB\", \"HEPBSAG\", \"HEPCAB\"  No results found for: \"HIV1X2\"    Assessment:  Mariam Hartley is a 48 y.o. female referred for consultation " of elevated Hct    I reviewed patient's chart including but not limited to labs, imaging, surgical/procedure notes, pathology, hospital notes, doctor's notes.    5/20/24 results:  WBC count & differential WNL  Macrocytic, Normochromic RBC's that are normal in count - H&H WNL - RDW WNL  Platelets WNL  Myeloid malignancy panel: Negative  Iron studies: Ferritin WNL at 68 - Iron elevated at 239 - TIBC WNL - %Sat elevated at 65% - NOT iron overload, patient likely had supplement containing iron or ate iron-rich food prior to lab draw  RJ: Positive w/ 1:80 homogeneous titer, positive anti-dsDNA ab's positive  LDH: WNL  Haptoglobin: WNL    Plan:  6/11/24 - Hematologic workup revealed normal hematocrit and LDH. Haptoglobin also WNL with normal retic. However, T. Bili remains elevated. Because LDH, Hb, RBC count, retic and haptoglobin are all WNL, it is very unlikely that this patient is hemolyzing. Patient reported at initial consult that she believes the patient has Gilbert Syndrome which would explain patient's elevated bilirubin. Myeloid malignancy panel negative w/ now normal hematocrit, therefore polycythemia ruled out. Of note, patient does have +RJ however, titer is not clinically significant. Of concern though, are anti-dsDNA ab's Per UpToDate, +RJ titers < 1:160 are not clinically significant and thus do not need to be worked up even further. Discussed recommendation to rheumatology to ensure there is no active rheumatologic condition, per our discusison patient declines referral to rheumatology. In regards to her mild macrocytosis, it is most likely attributed to underlying Gilbert's Syndrome or hepatic-related condition.    1) Elevated LDH / R/O Hemolysis - LDH & Hapto WNL, KAVON Negative  2) Elevated Hct - Resolved, Myeloid Malignancy Panel Negative  Does not need to continue to follow with me at this time but am more than happy to see the patient in the future if needed.    I had an extensive discussion with  the patient regarding the diagnosis and discussed the plan of therapy, including general considerations regarding side effects and outcomes. Pt understood and gave appropriate teach back about the plan of care. All questions were answered to the patient's satisfaction. The patient is instructed to contact us at any time if questions or problems arise. Thank you for the opportunity to participate in the care of this very pleasant patient.    Total time = 30 minutes. 50% or more of this time was spent in counseling and/or coordination of care including reviewing medical history/radiology/labs, examining patient, formulating outlined plan with team, and discussing plan with patient/family.    Wang Hurt PA-C

## 2024-10-09 DIAGNOSIS — E78.2 MIXED HYPERLIPIDEMIA: ICD-10-CM

## 2024-10-09 RX ORDER — ROSUVASTATIN CALCIUM 10 MG/1
10 TABLET, COATED ORAL NIGHTLY
Qty: 30 TABLET | Refills: 2 | Status: SHIPPED | OUTPATIENT
Start: 2024-10-09

## 2024-10-09 NOTE — TELEPHONE ENCOUNTER
Pharmacy called requesting Rx refill   Rosuvastatin 10 mg   D.M. Brian     Last seen 02/23/24  Scheduled 12/18/24  For her CPE   Last B/w 02/26/24    Pt has about 8 tablets left

## 2024-12-12 DIAGNOSIS — R79.89 ABNORMAL COMPLETE BLOOD COUNT: ICD-10-CM

## 2024-12-12 DIAGNOSIS — E78.2 MIXED HYPERLIPIDEMIA: ICD-10-CM

## 2024-12-12 DIAGNOSIS — Z00.00 ROUTINE GENERAL MEDICAL EXAMINATION AT A HEALTH CARE FACILITY: ICD-10-CM

## 2024-12-18 ENCOUNTER — APPOINTMENT (OUTPATIENT)
Dept: PRIMARY CARE | Facility: CLINIC | Age: 49
End: 2024-12-18
Payer: COMMERCIAL

## 2025-04-03 LAB
ALBUMIN SERPL-MCNC: 4.6 G/DL (ref 3.6–5.1)
ALP SERPL-CCNC: 38 U/L (ref 31–125)
ALT SERPL-CCNC: 15 U/L (ref 6–29)
ANION GAP SERPL CALCULATED.4IONS-SCNC: 9 MMOL/L (CALC) (ref 7–17)
AST SERPL-CCNC: 17 U/L (ref 10–35)
BASOPHILS # BLD AUTO: 18 CELLS/UL (ref 0–200)
BASOPHILS NFR BLD AUTO: 0.4 %
BILIRUB SERPL-MCNC: 1.7 MG/DL (ref 0.2–1.2)
BUN SERPL-MCNC: 14 MG/DL (ref 7–25)
CALCIUM SERPL-MCNC: 9.6 MG/DL (ref 8.6–10.2)
CHLORIDE SERPL-SCNC: 104 MMOL/L (ref 98–110)
CHOLEST SERPL-MCNC: 176 MG/DL
CHOLEST/HDLC SERPL: 2.3 (CALC)
CO2 SERPL-SCNC: 27 MMOL/L (ref 20–32)
CREAT SERPL-MCNC: 0.55 MG/DL (ref 0.5–0.99)
EGFRCR SERPLBLD CKD-EPI 2021: 113 ML/MIN/1.73M2
EOSINOPHIL # BLD AUTO: 60 CELLS/UL (ref 15–500)
EOSINOPHIL NFR BLD AUTO: 1.3 %
ERYTHROCYTE [DISTWIDTH] IN BLOOD BY AUTOMATED COUNT: 11.6 % (ref 11–15)
FERRITIN SERPL-MCNC: 43 NG/ML (ref 16–232)
FOLATE SERPL-MCNC: 15.2 NG/ML
GLUCOSE SERPL-MCNC: 87 MG/DL (ref 65–99)
HCT VFR BLD AUTO: 44.1 % (ref 35–45)
HDLC SERPL-MCNC: 77 MG/DL
HGB BLD-MCNC: 14.8 G/DL (ref 11.7–15.5)
IRON SATN MFR SERPL: 41 % (CALC) (ref 16–45)
IRON SERPL-MCNC: 138 MCG/DL (ref 40–190)
LDLC SERPL CALC-MCNC: 85 MG/DL (CALC)
LYMPHOCYTES # BLD AUTO: 1500 CELLS/UL (ref 850–3900)
LYMPHOCYTES NFR BLD AUTO: 32.6 %
MCH RBC QN AUTO: 34 PG (ref 27–33)
MCHC RBC AUTO-ENTMCNC: 33.6 G/DL (ref 32–36)
MCV RBC AUTO: 101.4 FL (ref 80–100)
MONOCYTES # BLD AUTO: 391 CELLS/UL (ref 200–950)
MONOCYTES NFR BLD AUTO: 8.5 %
NEUTROPHILS # BLD AUTO: 2631 CELLS/UL (ref 1500–7800)
NEUTROPHILS NFR BLD AUTO: 57.2 %
NONHDLC SERPL-MCNC: 99 MG/DL (CALC)
PLATELET # BLD AUTO: 265 THOUSAND/UL (ref 140–400)
PMV BLD REES-ECKER: 10.6 FL (ref 7.5–12.5)
POTASSIUM SERPL-SCNC: 4.3 MMOL/L (ref 3.5–5.3)
PROT SERPL-MCNC: 7.3 G/DL (ref 6.1–8.1)
RBC # BLD AUTO: 4.35 MILLION/UL (ref 3.8–5.1)
SODIUM SERPL-SCNC: 140 MMOL/L (ref 135–146)
T3FREE SERPL-MCNC: 3.7 PG/ML (ref 2.3–4.2)
T4 FREE SERPL-MCNC: 1.1 NG/DL (ref 0.8–1.8)
TIBC SERPL-MCNC: 335 MCG/DL (CALC) (ref 250–450)
TRIGL SERPL-MCNC: 61 MG/DL
TSH SERPL-ACNC: 2.02 MIU/L
VIT B12 SERPL-MCNC: 360 PG/ML (ref 200–1100)
WBC # BLD AUTO: 4.6 THOUSAND/UL (ref 3.8–10.8)

## 2025-04-04 ENCOUNTER — APPOINTMENT (OUTPATIENT)
Dept: PRIMARY CARE | Facility: CLINIC | Age: 50
End: 2025-04-04
Payer: COMMERCIAL

## 2025-04-04 VITALS
BODY MASS INDEX: 24.11 KG/M2 | DIASTOLIC BLOOD PRESSURE: 90 MMHG | HEART RATE: 88 BPM | OXYGEN SATURATION: 98 % | WEIGHT: 131 LBS | TEMPERATURE: 98.4 F | SYSTOLIC BLOOD PRESSURE: 126 MMHG | HEIGHT: 62 IN

## 2025-04-04 DIAGNOSIS — Z00.00 ROUTINE GENERAL MEDICAL EXAMINATION AT A HEALTH CARE FACILITY: Primary | ICD-10-CM

## 2025-04-04 DIAGNOSIS — E78.2 MIXED HYPERLIPIDEMIA: ICD-10-CM

## 2025-04-04 PROCEDURE — 99396 PREV VISIT EST AGE 40-64: CPT | Performed by: INTERNAL MEDICINE

## 2025-04-04 PROCEDURE — 3008F BODY MASS INDEX DOCD: CPT | Performed by: INTERNAL MEDICINE

## 2025-04-04 PROCEDURE — 1036F TOBACCO NON-USER: CPT | Performed by: INTERNAL MEDICINE

## 2025-04-04 RX ORDER — ROSUVASTATIN CALCIUM 10 MG/1
10 TABLET, COATED ORAL NIGHTLY
Qty: 90 TABLET | Refills: 1 | Status: SHIPPED | OUTPATIENT
Start: 2025-04-04

## 2025-04-04 ASSESSMENT — PATIENT HEALTH QUESTIONNAIRE - PHQ9
2. FEELING DOWN, DEPRESSED OR HOPELESS: NOT AT ALL
1. LITTLE INTEREST OR PLEASURE IN DOING THINGS: NOT AT ALL
SUM OF ALL RESPONSES TO PHQ9 QUESTIONS 1 AND 2: 0

## 2025-04-04 ASSESSMENT — ENCOUNTER SYMPTOMS
OCCASIONAL FEELINGS OF UNSTEADINESS: 0
LOSS OF SENSATION IN FEET: 0
DEPRESSION: 0

## 2025-04-04 NOTE — PROGRESS NOTES
"Subjective   Patient ID: Mariam Hartley is a 49 y.o. female who presents for Annual Exam.  HPI  Mariam Hartley is here for annual check-up.    Concerns here for annual     Reported Health good    Dental visits reg  Vision checks reg  has glasses and contacts     Diet  healthy  Exercise not reg  Caffeine  4 tea days  Tobacco never   Alcohol  daily wine     Female : Menstrual status /pregnancy status  reg    Colorectal cancer screening  11/2022    Current issues  here fo annual   She continues her routine meds.  She does not need a Pap today she declined breast exam but she will do her mammogram she is up-to-date on colonoscopy  Review of Systems    GENERAL - Denies fever, fatigue or chills  SKIN - Denies rash, new skin lesions, or change in moles  EYES - Denies blurred vision, or change in visual acuity  EARS - Denies ear pain, discharge, ringing, or difficulty hearing  NOSE - Denies nasal congestion, discharge, or bleeding  MOUTH - Denies sore throat, postnasal drip or painful/difficulty swallowing  NECK - Denies pain or swelling  RESPIRATORY - Denies shortness of breath, cough, wheezing  CARDIOVASCULAR - Denies palpitations, chest pain, orthopnea, peripheral edema, syncope or claudication  GASTROINTESTINAL - Denies nausea, vomiting, diarrhea, constipation, abdominal pain, melena and or bright red blood  GENITOURINARY - Denies dysuria, frequency of urination, urgency, or hesitancy  MUSCULOSKELETAL - Denies joint or muscle pain, or back pain  NEUROLOGICAL - Denies localized numbness, weakness, or tingling  PSYCHIATRIC - Denies depression, anxiety, substance abuse, suicidal or homicidal ideation  ENDOCRINE - Denies heat or cold intolerance, weight loss or gain, increasing thirst  HEMATO-IMMUNOLOGIC - Denies easy bruising, bleeding, oral ulcerations or recurrent infections      Objective   /90   Pulse 88   Temp 36.9 °C (98.4 °F) (Oral)   Ht 1.575 m (5' 2\")   Wt 59.4 kg (131 lb)   SpO2 98%   BMI 23.96 " kg/m²      Physical Exam  CONSTITUTIONAL - well nourished, well developed, looks like stated age, in no acute distress, not ill-appearing, and not tired appearing  SKIN - normal skin color and pigmentation, normal skin turgor without rash, lesions, or nodules visualized  HEAD - no trauma, normocephalic  EYES - pupils are equal and reactive to light, extraocular muscles are intact, and normal external exam  ENT - TM's intact, no injection, no signs of infection, uvula midline, normal tongue movement and throat normal, no exudate, nasal passage without discharge and patent  NECK - supple without rigidity, no neck mass was observed, no thyromegaly or thyroid nodules  CHEST - clear to auscultation, no wheezing, no crackles and no rales, good effort  CARDIAC - regular rate and regular rhythm, no skipped beats, no murmur  ABDOMEN - no organomegaly, soft, nontender, nondistended, normal bowel sounds, no guarding/rebound/rigidity, negative McBurney sign and negative Calle sign  EXTREMITIES - no edema, no deformities  NEUROLOGICAL - normal gait, normal balance, normal motor, no ataxia, DTRs equal and symmetrical; alert, oriented and no focal signs  PSYCHIATRIC - alert, pleasant and cordial, age-appropriate  IMMUNOLOGIC - no cervical lymphadenopathy    Assessment/Plan   Diagnoses and all orders for this visit:  Routine general medical examination at a health care facility  Mixed hyperlipidemia  -     rosuvastatin (Crestor) 10 mg tablet; Take 1 tablet (10 mg) by mouth once daily at bedtime.    Call with issues  Follow-up in 6 months recheck blood pressure and cholesterol  Will need to follow blood pressure closely  Asked her to decrease her caffeine intake to 1 cup of caffeinated tea daily.  She needs to increase her aerobic exercise as well.    Sammi Winter MD

## 2025-04-04 NOTE — PROGRESS NOTES
"Subjective   Patient ID: Mariam Hartley is a 49 y.o. female who presents for Follow-up (EP.  Follow up hyperlipidemia.  Labs done.  No concerns.).  HPI  ***    Review of Systems  Review of systems was performed and is otherwise negative except as noted in HPI.      Objective   /90   Pulse 88   Temp 36.9 °C (98.4 °F) (Oral)   Ht 1.575 m (5' 2\")   Wt 59.4 kg (131 lb)   SpO2 98%   BMI 23.96 kg/m²      Physical Exam  HEENT is normal  Lungs clear bilaterally  Heart is regular rate rhythm no murmurs  Abdomen benign  Lower extremities no edema     Assessment/Plan   Diagnoses and all orders for this visit:  Mixed hyperlipidemia    ***    Sammi Winter MD  "

## 2025-09-05 ENCOUNTER — APPOINTMENT (OUTPATIENT)
Dept: PRIMARY CARE | Facility: CLINIC | Age: 50
End: 2025-09-05
Payer: COMMERCIAL

## 2025-10-03 ENCOUNTER — APPOINTMENT (OUTPATIENT)
Dept: PRIMARY CARE | Facility: CLINIC | Age: 50
End: 2025-10-03
Payer: COMMERCIAL